# Patient Record
(demographics unavailable — no encounter records)

---

## 2025-02-10 NOTE — REASON FOR VISIT
[Follow - Up] : a follow-up visit [Osteoporosis] : osteoporosis [Other: _____] : [unfilled] [Ad Hoc ] : provided by an ad hoc  [Source: ______] : History obtained from [facility] [Interpreters_FullName] : Violet Rose [Interpreters_Relationshiptopatient] : Tavares [TWNoteComboBox1] : [place]

## 2025-02-10 NOTE — PHYSICAL EXAM
[Alert] : alert [Healthy Appearance] : healthy appearance [No Acute Distress] : no acute distress [Well Developed] : well developed [Normal Voice/Communication] : normal voice communication [Normal Sclera/Conjunctiva] : normal sclera/conjunctiva [EOMI] : extra ocular movement intact [Normal Hearing] : hearing was normal [No Respiratory Distress] : no respiratory distress [Normal Rate and Effort] : normal respiratory rate and effort [Normal Rate] : heart rate was normal [No Spinal Tenderness] : no spinal tenderness [Kyphosis] : no kyphosis present [Scoliosis] : scoliosis present [No Stigmata of Cushings Syndrome] : no stigmata of Cushings Syndrome [Normal Gait] : normal gait [No Involuntary Movements] : no involuntary movements were seen [Normal Strength/Tone] : muscle strength and tone were normal [No Rash] : no rash [Oriented x3] : oriented to person, place, and time [Normal Affect] : the affect was normal [Recent Memory Normal] : recent memory was not impaired [Normal Insight/Judgement] : insight and judgment were intact [Normal Mood] : the mood was normal [Remote Memory Normal] : remote memory was not impaired [de-identified] : No observed focal neurological deficits.

## 2025-02-10 NOTE — ASSESSMENT
[FreeTextEntry1] : Discussed possible adverse effects of osteoporosis therapy, including hypocalcemia, injection site reactions, musculoskeletal aches, osteonecrosis of the jaw, and atypical femoral fractures. These complications do not occur often, even rarely. Routine labs will be drawn every 6 months to monitor for hypocalcemia and vitamin D deficiency. I recommend regular dental evaluations to prevent the need for extractions or implants, thus maintaining jawbone health. We discussed that Reclast infusion may cause flu-like reactions and other injections may cause injection-site reactions. Evenity has a black box warning prohibiting its use in patients who have had cardiovascular or cerebrovascular events within the last year. I emphasized that bone density gains from injection therapy can diminish if injections are delayed, so adherence to the injection schedule is crucial. To reduce the risk of AFF, we aim to limit the duration of consecutive treatments to less than 5-7 years. We may consider a drug holiday once annual DEXA scans demonstrate resolution of osteoporosis, but this will require continued annual surveillance to monitor for bone loss. If discontinuing injection therapy, we should initiate bisphosphonate therapy within 3-6 months to prevent rapid bone density loss. If she experiences hip or groin pain during treatment, she should contact me immediately for an evaluation with hip x-rays to investigate for AFF. If a fragility fracture occurs during treatment, we will need to consider an alternative therapy. Discussed supportive management by ingesting [id number] mg elemental calcium and up to 2000 units vitamin D daily through diet and supplementation. Encouraged weight-bearing exercises and avoidance of prolonged bedrest.   All questions and concerns were fully addressed to patient's satisfaction. Patient is in agreement with stated plan.  Note: This document has been prepared using voice dictation software. While efforts have been made to ensure accuracy, some typographical or transcription errors may be present. Please interpret the information accordingly.

## 2025-02-10 NOTE — HISTORY OF PRESENT ILLNESS
[Calcium (dietary)] : dietary Calcium [Vitamin D (sun exposure)] : Vitamin D from sun exposure [Vitamin D (oral)] : Vitamin D orally [Low Calcium Intake] : low calcium intake [Low Vit D Intake/Exposure] : low vitamin D intake [Premat. Menopause (natural)] : no history of premature menopause [Taking Steroids] : no history of taking steroids [Hyperparathyroidism] : no history of hyperparathyroidism [Kidney Stones] : no history of kidney stones [Current Smoking___(ppd)] : not currently smoking [Previous Fragility Fracture] : no previous fragility fracture [Family History of Hip Fracture] : family history of hip fracture [Family History of Osteoporosis] : family history of osteoporosis [History of Radiation Therapy] : no history of radiation therapy [History of Blood Clots] : no history of blood clots [High Fall Risk] : no fall risk [Frequent Falls] : no frequent falls [Uses a Walker/Cane] : no use of a walker/cane [Loss of Height] : loss of height [Arthralgias] : arthralgias [New Fracture] : no new fracture [de-identified] : c/o lefft knee pain  spine deformity (scoliosis)